# Patient Record
Sex: MALE | Race: WHITE | NOT HISPANIC OR LATINO | Employment: FULL TIME | URBAN - METROPOLITAN AREA
[De-identification: names, ages, dates, MRNs, and addresses within clinical notes are randomized per-mention and may not be internally consistent; named-entity substitution may affect disease eponyms.]

---

## 2017-05-30 ENCOUNTER — ALLSCRIPTS OFFICE VISIT (OUTPATIENT)
Dept: OTHER | Facility: OTHER | Age: 57
End: 2017-05-30

## 2017-07-11 ENCOUNTER — ALLSCRIPTS OFFICE VISIT (OUTPATIENT)
Dept: OTHER | Facility: OTHER | Age: 57
End: 2017-07-11

## 2018-01-13 VITALS
WEIGHT: 222 LBS | HEIGHT: 73 IN | SYSTOLIC BLOOD PRESSURE: 134 MMHG | HEART RATE: 82 BPM | DIASTOLIC BLOOD PRESSURE: 86 MMHG | BODY MASS INDEX: 29.42 KG/M2

## 2018-01-13 VITALS
HEART RATE: 106 BPM | SYSTOLIC BLOOD PRESSURE: 128 MMHG | BODY MASS INDEX: 29.03 KG/M2 | HEIGHT: 73 IN | DIASTOLIC BLOOD PRESSURE: 95 MMHG | WEIGHT: 219 LBS

## 2018-02-02 ENCOUNTER — APPOINTMENT (OUTPATIENT)
Dept: RADIOLOGY | Facility: CLINIC | Age: 58
End: 2018-02-02
Payer: COMMERCIAL

## 2018-02-02 VITALS
HEIGHT: 73 IN | DIASTOLIC BLOOD PRESSURE: 84 MMHG | SYSTOLIC BLOOD PRESSURE: 136 MMHG | HEART RATE: 101 BPM | BODY MASS INDEX: 29.69 KG/M2 | WEIGHT: 224 LBS

## 2018-02-02 DIAGNOSIS — M25.562 ACUTE PAIN OF LEFT KNEE: Primary | ICD-10-CM

## 2018-02-02 PROBLEM — K31.9 STOMACH PROBLEMS: Status: ACTIVE | Noted: 2017-05-30

## 2018-02-02 PROBLEM — M17.11 PRIMARY LOCALIZED OSTEOARTHRITIS OF RIGHT KNEE: Status: ACTIVE | Noted: 2017-05-30

## 2018-02-02 PROCEDURE — 99214 OFFICE O/P EST MOD 30 MIN: CPT | Performed by: ORTHOPAEDIC SURGERY

## 2018-02-02 PROCEDURE — 73562 X-RAY EXAM OF KNEE 3: CPT

## 2018-02-02 RX ORDER — SIMVASTATIN 10 MG
TABLET ORAL
Refills: 3 | COMMUNITY
Start: 2018-01-07

## 2018-02-02 RX ORDER — METHOCARBAMOL 750 MG/1
TABLET, FILM COATED ORAL
Refills: 0 | COMMUNITY
Start: 2018-01-26

## 2018-02-02 RX ORDER — ACETAMINOPHEN 160 MG
TABLET,DISINTEGRATING ORAL
COMMUNITY
Start: 2017-10-20

## 2018-02-02 RX ORDER — METRONIDAZOLE 500 MG/1
TABLET ORAL
COMMUNITY
Start: 2017-11-11

## 2018-02-02 RX ORDER — LISINOPRIL 5 MG/1
5 TABLET ORAL
Refills: 3 | COMMUNITY
Start: 2017-12-08

## 2018-02-02 RX ORDER — TRAMADOL HYDROCHLORIDE 50 MG/1
TABLET ORAL
COMMUNITY
Start: 2017-11-11

## 2018-02-02 RX ORDER — PROMETHAZINE HYDROCHLORIDE 25 MG/1
TABLET ORAL
Refills: 0 | COMMUNITY
Start: 2017-11-13

## 2018-02-02 NOTE — PROGRESS NOTES
Patient Name:  Waylon Hutchins  MRN:  [id number]    Assessment & Plan    Acute left knee pain, possible meniscus tear  Recommended activity modification and OTC medications as needed  MRI left knee to evaluate lateral meniscus  Follow-up after study is complete  History of the Present Illness    19-year-old male presents with sudden onset of left knee pain 2 months ago that significantly worsened last week  He had very limited walking tolerance and increased pain with knee flexion or twisting/pivoting activities  No obvious swelling  No recent injury  The pain has improved somewhat but remains moderate to severe at times  He tried some pain medications that he had left over from another problem minimal relief  Review of Systems    As stated in the history  The remaining systems were reviewed and are negative  Physical Exam    /84   Pulse 101   Ht 6' 1" (1 854 m)   Wt 102 kg (224 lb)   BMI 29 55 kg/m²     Left knee:  Trace palpable effusion  Tenderness to palpation lateral joint line  Range of motion is full  Stable with Lachman test   Stable with varus and valgus stress  Stable with posterior drawer test   Choco's test is positive  Patellar grind test is negative  Constitutional:  Well-developed and well-nourished  Eyes:  Anicteric sclerae  Neck:  Supple  Lungs:  Unlabored breathing  Cardiovascular:  Capillary refill is less than 2 seconds  Skin:  Intact without erythema  Neurologic:  Sensation intact to light touch  Psychiatric:  Mood and affect are appropriate  Studies    I have personally reviewed pertinent films in PACS, and my interpretation follows  X-rays left knee 2/2/18:  Mild to moderate degenerative changes  No acute bony abnormalities  History reviewed  No pertinent past medical history      Past Surgical History:   Procedure Laterality Date    KNEE SURGERY         No Known Allergies    No current outpatient prescriptions on file prior to visit  No current facility-administered medications on file prior to visit  Social History   Substance Use Topics    Smoking status: Never Smoker    Smokeless tobacco: Never Used    Alcohol use Not on file       History reviewed  No pertinent family history

## 2018-02-13 ENCOUNTER — HOSPITAL ENCOUNTER (OUTPATIENT)
Dept: MRI IMAGING | Facility: HOSPITAL | Age: 58
Discharge: HOME | End: 2018-02-13
Attending: ORTHOPAEDIC SURGERY
Payer: COMMERCIAL

## 2018-02-13 DIAGNOSIS — M25.562 ACUTE PAIN OF LEFT KNEE: ICD-10-CM

## 2018-02-13 PROCEDURE — 73721 MRI JNT OF LWR EXTRE W/O DYE: CPT

## 2018-02-16 NOTE — TELEPHONE ENCOUNTER
Pt would like a call from the office to tell him the results of his left knee mri  He didn't want a follow up appt   He can be reached at [phone]

## 2018-03-13 VITALS
SYSTOLIC BLOOD PRESSURE: 134 MMHG | DIASTOLIC BLOOD PRESSURE: 93 MMHG | HEIGHT: 73 IN | HEART RATE: 96 BPM | WEIGHT: 217 LBS | BODY MASS INDEX: 28.76 KG/M2

## 2018-03-13 DIAGNOSIS — S83.232A COMPLEX TEAR OF MEDIAL MENISCUS OF LEFT KNEE AS CURRENT INJURY, INITIAL ENCOUNTER: Primary | ICD-10-CM

## 2018-03-13 DIAGNOSIS — M17.11 PRIMARY LOCALIZED OSTEOARTHRITIS OF RIGHT KNEE: ICD-10-CM

## 2018-03-13 PROCEDURE — 99213 OFFICE O/P EST LOW 20 MIN: CPT | Performed by: ORTHOPAEDIC SURGERY

## 2018-03-13 NOTE — PROGRESS NOTES
Patient Name:  Olga Troy  MR#:  [id number]    Assessment & Plan    Left knee medial meniscus tear with moderate DJD  1  Recommended continued conservative treatment for now  Symptoms are currently minimal   2  Discussed arthroscopic partial medial meniscectomy and chondroplasty if pain worsens  3  Patient will call to arrange surgery as indicated  Subjective    Patient returns today to review the study results and discuss treatment options  He reports significant improvement in his pain since his last visit on 2/2/18  He was initially having severe pain but reports sudden resolution recently  Objective    /93   Pulse 96   Ht 6' 1" (1 854 m)   Wt 98 4 kg (217 lb)   BMI 28 63 kg/m²       Data Review    I have personally reviewed pertinent films in PACS, and my interpretation follows  MRI left knee 2/13/18:  Complex tear posterior horn of the medial meniscus  Moderate degenerative changes in the medial and patellofemoral compartments  Small effusion  Counseling    The patient was counseled regarding diagnostic results, impressions, patient/family education, instructions for management, risks and benefits of treatment options, and prognosis  The total time of the encounter was 15 minutes, and more than 50% of that time was spent in counseling and coordination of care

## 2018-09-21 ENCOUNTER — APPOINTMENT (OUTPATIENT)
Dept: RADIOLOGY | Facility: CLINIC | Age: 58
End: 2018-09-21
Payer: COMMERCIAL

## 2018-09-21 VITALS
DIASTOLIC BLOOD PRESSURE: 80 MMHG | SYSTOLIC BLOOD PRESSURE: 124 MMHG | BODY MASS INDEX: 30.48 KG/M2 | WEIGHT: 230 LBS | HEART RATE: 75 BPM | HEIGHT: 73 IN

## 2018-09-21 DIAGNOSIS — M25.561 RIGHT KNEE PAIN, UNSPECIFIED CHRONICITY: ICD-10-CM

## 2018-09-21 DIAGNOSIS — M17.11 OSTEOARTHRITIS OF RIGHT KNEE, UNSPECIFIED OSTEOARTHRITIS TYPE: Primary | ICD-10-CM

## 2018-09-21 PROCEDURE — 73562 X-RAY EXAM OF KNEE 3: CPT

## 2018-09-21 PROCEDURE — 99213 OFFICE O/P EST LOW 20 MIN: CPT | Performed by: ORTHOPAEDIC SURGERY

## 2018-09-21 PROCEDURE — 20610 DRAIN/INJ JOINT/BURSA W/O US: CPT | Performed by: ORTHOPAEDIC SURGERY

## 2018-09-21 RX ORDER — LISINOPRIL 5 MG/1
5 TABLET ORAL
COMMUNITY
Start: 2018-09-18

## 2018-09-21 RX ORDER — TAMSULOSIN HYDROCHLORIDE 0.4 MG/1
0.4 CAPSULE ORAL
COMMUNITY
Start: 2018-08-30 | End: 2019-08-30

## 2018-09-21 RX ORDER — METHYLPREDNISOLONE ACETATE 40 MG/ML
1 INJECTION, SUSPENSION INTRA-ARTICULAR; INTRALESIONAL; INTRAMUSCULAR; SOFT TISSUE
Status: COMPLETED | OUTPATIENT
Start: 2018-09-21 | End: 2018-09-21

## 2018-09-21 RX ORDER — BUPIVACAINE HYDROCHLORIDE 2.5 MG/ML
4 INJECTION, SOLUTION INFILTRATION; PERINEURAL
Status: COMPLETED | OUTPATIENT
Start: 2018-09-21 | End: 2018-09-21

## 2018-09-21 RX ADMIN — METHYLPREDNISOLONE ACETATE 1 ML: 40 INJECTION, SUSPENSION INTRA-ARTICULAR; INTRALESIONAL; INTRAMUSCULAR; SOFT TISSUE at 10:15

## 2018-09-21 RX ADMIN — BUPIVACAINE HYDROCHLORIDE 4 ML: 2.5 INJECTION, SOLUTION INFILTRATION; PERINEURAL at 10:15

## 2018-09-21 NOTE — PROGRESS NOTES
Assessment:     1  Osteoarthritis of right knee, unspecified osteoarthritis type    2  Right knee pain, unspecified chronicity        Plan:   - CSI performed into the right knee without complication   - If he does not experience significant relief of his symptoms following the injection he will give the office a call and a MRI with be ordered with office follow-up after the study is complete      Problem List Items Addressed This Visit     None      Visit Diagnoses     Osteoarthritis of right knee, unspecified osteoarthritis type    -  Primary    Relevant Orders    Large joint arthrocentesis    Right knee pain, unspecified chronicity        Relevant Orders    XR knee 3 vw right non injury         Subjective:     Patient ID: Cleve Peterson is a 56 y o  male  Chief Complaint:  56 y o  male presents to the office today for follow up for right knee pain  The patient was seen aprox  1 year ago  At that time the patient was given synvisc one into his right knee  The patient notes over the last 2-3 weeks he has been experencing severe posterior right knee pain that is radiating down his right leg  The patient notes getting up from a seated position is difficult due to right knee pain and stiffness  The patient states he has difficulty falling asleep at night due to pain  The patient is taking Advil as needed for pain relief with minimal relief of his symptoms  The patient denies swelling  The patient denies associated numbness or tingling          Allergy:  Allergies   Allergen Reactions    Bee Venom Anaphylaxis     Sometimes benadryl works; sometimes needs to go to ER     Medications:  all current active meds have been reviewed  Past Medical History:  History reviewed  No pertinent past medical history  Past Surgical History:  Past Surgical History:   Procedure Laterality Date    KNEE SURGERY       Family History:  History reviewed  No pertinent family history    Social History:  History   Alcohol use Not on file History   Drug use: Unknown     History   Smoking Status    Never Smoker   Smokeless Tobacco    Never Used     Review of Systems   Constitutional: Negative for chills, fever and unexpected weight change  HENT: Negative for hearing loss, nosebleeds and sore throat  Eyes: Negative for pain, redness and visual disturbance  Respiratory: Negative for cough, shortness of breath and wheezing  Cardiovascular: Negative for chest pain, palpitations and leg swelling  Gastrointestinal: Negative for abdominal pain, nausea and vomiting  Endocrine: Negative for polydipsia and polyuria  Genitourinary: Negative for difficulty urinating and hematuria  Musculoskeletal: Positive for arthralgias and myalgias  Negative for joint swelling  Skin: Negative for rash and wound  Neurological: Negative for dizziness, numbness and headaches  Psychiatric/Behavioral: Negative for decreased concentration, dysphoric mood and suicidal ideas  The patient is not nervous/anxious  Objective:  BP Readings from Last 1 Encounters:   09/21/18 124/80      Wt Readings from Last 1 Encounters:   09/21/18 104 kg (230 lb)      BMI:   Estimated body mass index is 30 34 kg/m² as calculated from the following:    Height as of this encounter: 6' 1" (1 854 m)  Weight as of this encounter: 104 kg (230 lb)  BSA:   Estimated body surface area is 2 28 meters squared as calculated from the following:    Height as of this encounter: 6' 1" (1 854 m)  Weight as of this encounter: 104 kg (230 lb)  Physical Exam   Constitutional: He is oriented to person, place, and time  He appears well-developed and well-nourished  Eyes: Conjunctivae are normal  Pupils are equal, round, and reactive to light  Pulmonary/Chest: Effort normal and breath sounds normal    Musculoskeletal:        Right knee: He exhibits effusion  Neurological: He is alert and oriented to person, place, and time  Skin: Skin is warm and dry     Psychiatric: He has a normal mood and affect  His behavior is normal      Right Knee Exam     Tenderness   The patient is experiencing tenderness in the medial joint line  Range of Motion   Extension: 0   Flexion: 110     Tests   Varus: negative  Valgus: negative    Other   Erythema: absent  Sensation: normal  Pulse: present  Swelling: none  Other tests: effusion present            Large joint arthrocentesis  Date/Time: 9/21/2018 10:15 AM  Consent given by: patient  Site marked: site marked  Timeout: Immediately prior to procedure a time out was called to verify the correct patient, procedure, equipment, support staff and site/side marked as required   Supporting Documentation  Indications: pain   Procedure Details  Location: knee - R knee  Needle size: 22 G  Ultrasound guidance: no  Approach: anterolateral  Medications administered: 4 mL bupivacaine 0 25 %; 1 mL methylPREDNISolone acetate 40 mg/mL    Patient tolerance: patient tolerated the procedure well with no immediate complications  Dressing:  Sterile dressing applied        I have personally reviewed pertinent films in PACS and my interpretation is Moderate to severe degenerative changes of right knee

## 2018-10-15 DIAGNOSIS — M25.561 RIGHT KNEE PAIN, UNSPECIFIED CHRONICITY: Primary | ICD-10-CM

## 2018-10-22 NOTE — TELEPHONE ENCOUNTER
Fabiola Caban, this is an MRI you were working on that the patient scheduled himself    I guess he's calling asking if it's been approved

## 2018-10-22 NOTE — TELEPHONE ENCOUNTER
Adia Oconnor  [phone]    Dr Loly Saab    Patient called checking status of authorization for MRI   Still pending approval

## 2018-11-06 VITALS
DIASTOLIC BLOOD PRESSURE: 78 MMHG | SYSTOLIC BLOOD PRESSURE: 127 MMHG | BODY MASS INDEX: 30.09 KG/M2 | HEART RATE: 78 BPM | HEIGHT: 73 IN | WEIGHT: 227 LBS

## 2018-11-06 DIAGNOSIS — M17.11 PRIMARY LOCALIZED OSTEOARTHRITIS OF RIGHT KNEE: ICD-10-CM

## 2018-11-06 DIAGNOSIS — S83.231A COMPLEX TEAR OF MEDIAL MENISCUS OF RIGHT KNEE AS CURRENT INJURY, INITIAL ENCOUNTER: Primary | ICD-10-CM

## 2018-11-06 PROCEDURE — 99214 OFFICE O/P EST MOD 30 MIN: CPT | Performed by: ORTHOPAEDIC SURGERY

## 2018-11-06 RX ORDER — CHLORHEXIDINE GLUCONATE 0.12 MG/ML
RINSE ORAL
Refills: 0 | COMMUNITY
Start: 2018-10-10

## 2018-11-06 RX ORDER — INFLUENZA A VIRUS A/MICHIGAN/45/2015 X-275 (H1N1) ANTIGEN (FORMALDEHYDE INACTIVATED), INFLUENZA A VIRUS A/HONG KONG/4801/2014 X-263B (H3N2) ANTIGEN (FORMALDEHYDE INACTIVATED), INFLUENZA B VIRUS B/PHUKET/3073/2013 ANTIGEN (FORMALDEHYDE INACTIVATED), AND INFLUENZA B VIRUS B/BRISBANE/60/2008 ANTIGEN (FORMALDEHYDE INACTIVATED) 15; 15; 15; 15 UG/.5ML; UG/.5ML; UG/.5ML; UG/.5ML
INJECTION, SUSPENSION INTRAMUSCULAR
Refills: 0 | COMMUNITY
Start: 2018-10-05

## 2018-11-06 RX ORDER — CLINDAMYCIN HYDROCHLORIDE 300 MG/1
300 CAPSULE ORAL EVERY 8 HOURS
Refills: 1 | COMMUNITY
Start: 2018-10-10

## 2018-11-06 NOTE — PROGRESS NOTES
Patient Name:  Karlos Smart  MRN:  [id number]    Assessment     1  Complex tear of medial meniscus of right knee as current injury, initial encounter  Case request operating room: Right knee arthroscopic partial medial meniscectomy, chondroplasty    Basic metabolic panel    Case request operating room: Right knee arthroscopic partial medial meniscectomy, chondroplasty   2  Primary localized osteoarthritis of right knee         Plan     Right knee medial meniscus tear, DJD  1  Patient notes persistent pain which affects his activities of daily living and prevents him from achieving his desired lifestyle  Reviewed the procedure of a right knee arthroscopic partial medial meniscectomy and chondroplasty in detail as well as risks and benefits  He has elected to proceed  2  Follow up 10-14 days postoperatively  Subjective     56-year-old male returns to the office today for follow-up regarding his right knee  He recently underwent an MRI and is here to review the results  He notes persistent pain in the right knee which is generalized but worse on the medial aspect  He notes swelling  The pain is worse with prolonged ambulating and weight-bearing  He did receive a cortisone injection at his last visit which provided one week over a period he does note weakness and instability  No significant stiffness  The no fevers or chills  General ROS:  Negative for fever, lethargy/malaise, or night sweats  Neurological ROS:  Negative for confusion or seizures  Objective     /78   Pulse 78   Ht 6' 1" (1 854 m)   Wt 103 kg (227 lb)   BMI 29 95 kg/m²     Right knee:  No gross deformity  Skin intact  No erythema ecchymosis or swelling  Trace effusion  Tenderness to palpation medial joint line  No tenderness to palpation lateral joint line  Range of motion includes full flexion and extension with crepitation noted  Stable to varus and valgus stress    Positive Choco's test   Sensation intact right lower extremity  Stable Lachman test   Skin warm and well perfused  Appropriate mood and affect  Data Review     I have personally reviewed pertinent films in PACS, and my interpretation follows  MRI right knee reveals evidence of a recurrent tear in the medial meniscus posterior horn  Associated moderate tricompartmental degenerative changes      Scribe Attestation    I,:   Aurelia Flores PA-C am acting as a scribe while in the presence of the attending physician :        I,:   Pako Alcantara MD personally performed the services described in this documentation    as scribed in my presence :

## 2018-11-07 PROBLEM — S83.241A OTHER TEAR OF MEDIAL MENISCUS, CURRENT INJURY, RIGHT KNEE, INITIAL ENCOUNTER: Status: ACTIVE | Noted: 2018-11-07

## 2018-11-21 NOTE — TELEPHONE ENCOUNTER
Patient  [phone]  Dr Bonifacio June    Patient called in to say that he needs to reschedule his sx date with Dr Bonifacio June on 12/6/18

## 2018-11-23 ENCOUNTER — APPOINTMENT (OUTPATIENT)
Dept: LAB | Facility: CLINIC | Age: 58
End: 2018-11-23
Payer: COMMERCIAL

## 2018-11-23 ENCOUNTER — TRANSCRIBE ORDERS (OUTPATIENT)
Dept: LAB | Facility: CLINIC | Age: 58
End: 2018-11-23

## 2018-11-23 DIAGNOSIS — S83.231A COMPLEX TEAR OF MEDIAL MENISCUS OF RIGHT KNEE AS CURRENT INJURY, INITIAL ENCOUNTER: ICD-10-CM

## 2018-11-23 LAB
ANION GAP SERPL CALCULATED.3IONS-SCNC: 8 MMOL/L (ref 4–13)
BUN SERPL-MCNC: 13 MG/DL (ref 5–25)
CALCIUM SERPL-MCNC: 8.8 MG/DL (ref 8.3–10.1)
CHLORIDE SERPL-SCNC: 105 MMOL/L (ref 100–108)
CO2 SERPL-SCNC: 28 MMOL/L (ref 21–32)
CREAT SERPL-MCNC: 1.05 MG/DL (ref 0.6–1.3)
GFR SERPL CREATININE-BSD FRML MDRD: 80 ML/MIN/1.73SQ M
GLUCOSE P FAST SERPL-MCNC: 116 MG/DL (ref 65–99)
POTASSIUM SERPL-SCNC: 4.1 MMOL/L (ref 3.5–5.3)
SODIUM SERPL-SCNC: 141 MMOL/L (ref 136–145)

## 2018-11-23 PROCEDURE — 80048 BASIC METABOLIC PNL TOTAL CA: CPT

## 2018-11-23 PROCEDURE — 36415 COLL VENOUS BLD VENIPUNCTURE: CPT

## 2018-11-27 ENCOUNTER — ANESTHESIA EVENT (OUTPATIENT)
Dept: PERIOP | Facility: AMBULARY SURGERY CENTER | Age: 58
End: 2018-11-27

## 2018-11-29 ENCOUNTER — ANESTHESIA (OUTPATIENT)
Dept: PERIOP | Facility: AMBULARY SURGERY CENTER | Age: 58
End: 2018-11-29

## 2018-12-04 DIAGNOSIS — M17.11 PRIMARY LOCALIZED OSTEOARTHRITIS OF RIGHT KNEE: Primary | ICD-10-CM

## 2019-04-30 VITALS
WEIGHT: 238 LBS | HEIGHT: 73 IN | SYSTOLIC BLOOD PRESSURE: 145 MMHG | DIASTOLIC BLOOD PRESSURE: 90 MMHG | BODY MASS INDEX: 31.54 KG/M2 | HEART RATE: 98 BPM

## 2019-04-30 DIAGNOSIS — M17.11 PRIMARY LOCALIZED OSTEOARTHRITIS OF RIGHT KNEE: Primary | ICD-10-CM

## 2019-04-30 PROCEDURE — 20610 DRAIN/INJ JOINT/BURSA W/O US: CPT | Performed by: ORTHOPAEDIC SURGERY

## 2022-08-30 ENCOUNTER — HOSPITAL ENCOUNTER (OUTPATIENT)
Age: 62
Discharge: HOME | End: 2022-08-30
Payer: COMMERCIAL

## 2022-08-30 VITALS
SYSTOLIC BLOOD PRESSURE: 134 MMHG | DIASTOLIC BLOOD PRESSURE: 93 MMHG | OXYGEN SATURATION: 97 % | TEMPERATURE: 99 F | HEIGHT: 73 IN | BODY MASS INDEX: 28.49 KG/M2 | WEIGHT: 215 LBS | HEART RATE: 77 BPM | RESPIRATION RATE: 18 BRPM

## 2022-08-30 DIAGNOSIS — H81.10 BENIGN PAROXYSMAL POSITIONAL VERTIGO, UNSPECIFIED LATERALITY: Primary | ICD-10-CM

## 2022-08-30 PROCEDURE — 99203 OFFICE O/P NEW LOW 30 MIN: CPT | Performed by: NURSE PRACTITIONER

## 2022-08-30 PROCEDURE — 96374 THER/PROPH/DIAG INJ IV PUSH: CPT | Performed by: NURSE PRACTITIONER

## 2022-08-30 RX ORDER — ONDANSETRON 2 MG/ML
4 INJECTION INTRAMUSCULAR; INTRAVENOUS ONCE
Status: COMPLETED | OUTPATIENT
Start: 2022-08-30 | End: 2022-08-30

## 2022-08-30 RX ORDER — MECLIZINE HYDROCHLORIDE 25 MG/1
25 TABLET ORAL 3 TIMES DAILY PRN
Qty: 20 TABLET | Refills: 0 | Status: SHIPPED | OUTPATIENT
Start: 2022-08-30

## 2022-08-30 RX ORDER — ONDANSETRON 4 MG/1
4 TABLET, ORALLY DISINTEGRATING ORAL EVERY 4 HOURS PRN
Qty: 10 TABLET | Refills: 0 | Status: SHIPPED | OUTPATIENT
Start: 2022-08-30 | End: 2022-09-06

## 2022-08-30 RX ORDER — DIAZEPAM 5 MG/1
5 TABLET ORAL 3 TIMES DAILY PRN
Qty: 20 TABLET | Refills: 0 | Status: SHIPPED | OUTPATIENT
Start: 2022-08-30 | End: 2022-09-06

## 2022-08-30 RX ORDER — SODIUM CHLORIDE 9 MG/ML
1000 INJECTION, SOLUTION INTRAVENOUS ONCE
Status: COMPLETED | OUTPATIENT
Start: 2022-08-30 | End: 2022-08-30

## 2022-08-30 RX ORDER — ONDANSETRON 4 MG/1
4 TABLET, ORALLY DISINTEGRATING ORAL ONCE
Status: DISCONTINUED | OUTPATIENT
Start: 2022-08-30 | End: 2022-08-30

## 2022-08-30 RX ORDER — MECLIZINE HCL 12.5 MG/1
50 TABLET ORAL ONCE
Status: COMPLETED | OUTPATIENT
Start: 2022-08-30 | End: 2022-08-30

## 2022-08-31 NOTE — CM/SW NOTE
Received a call from the patient requesting if and ER MD can call for a rx for Valium to a different pharmacy. pt was seen in immediate care for vertigo and got a rx for valium as needed, rx was faxed to [facility] in [address] [place] [address] but when pt arrived there to  the rx- pharmacy was already close. Pt went to a [facility] [address] [facility] but unable to fill the Valium. ER MD unable to rx the valium since pt was not seen here in ER and its a controlled substance. Explained everything to the pt, pt voiced understanding.